# Patient Record
Sex: MALE | Race: BLACK OR AFRICAN AMERICAN | NOT HISPANIC OR LATINO | Employment: FULL TIME | URBAN - METROPOLITAN AREA
[De-identification: names, ages, dates, MRNs, and addresses within clinical notes are randomized per-mention and may not be internally consistent; named-entity substitution may affect disease eponyms.]

---

## 2017-01-11 RX ORDER — ERGOCALCIFEROL 1.25 MG/1
CAPSULE ORAL
Qty: 10 CAPSULE | Refills: 0 | OUTPATIENT
Start: 2017-01-11

## 2017-01-12 ENCOUNTER — LAB VISIT (OUTPATIENT)
Dept: LAB | Facility: HOSPITAL | Age: 67
End: 2017-01-12
Attending: FAMILY MEDICINE
Payer: COMMERCIAL

## 2017-01-12 ENCOUNTER — OFFICE VISIT (OUTPATIENT)
Dept: INTERNAL MEDICINE | Facility: CLINIC | Age: 67
End: 2017-01-12
Payer: COMMERCIAL

## 2017-01-12 VITALS
WEIGHT: 216.06 LBS | DIASTOLIC BLOOD PRESSURE: 86 MMHG | TEMPERATURE: 96 F | BODY MASS INDEX: 26.31 KG/M2 | SYSTOLIC BLOOD PRESSURE: 142 MMHG | HEIGHT: 76 IN

## 2017-01-12 DIAGNOSIS — Z12.5 PROSTATE CANCER SCREENING: ICD-10-CM

## 2017-01-12 DIAGNOSIS — Z23 NEED FOR PROPHYLACTIC VACCINATION WITH STREPTOCOCCUS PNEUMONIAE (PNEUMOCOCCUS) AND INFLUENZA VACCINES: ICD-10-CM

## 2017-01-12 DIAGNOSIS — Z00.00 ROUTINE GENERAL MEDICAL EXAMINATION AT A HEALTH CARE FACILITY: ICD-10-CM

## 2017-01-12 DIAGNOSIS — E55.9 VITAMIN D DEFICIENCY: ICD-10-CM

## 2017-01-12 DIAGNOSIS — Z00.00 ROUTINE GENERAL MEDICAL EXAMINATION AT A HEALTH CARE FACILITY: Primary | ICD-10-CM

## 2017-01-12 LAB
25(OH)D3+25(OH)D2 SERPL-MCNC: 24 NG/ML
ALBUMIN SERPL BCP-MCNC: 3.9 G/DL
ALP SERPL-CCNC: 66 U/L
ALT SERPL W/O P-5'-P-CCNC: 16 U/L
ANION GAP SERPL CALC-SCNC: 9 MMOL/L
AST SERPL-CCNC: 33 U/L
BASOPHILS # BLD AUTO: 0.01 K/UL
BASOPHILS NFR BLD: 0.2 %
BILIRUB SERPL-MCNC: 0.8 MG/DL
BUN SERPL-MCNC: 13 MG/DL
CALCIUM SERPL-MCNC: 8.9 MG/DL
CHLORIDE SERPL-SCNC: 106 MMOL/L
CHOLEST/HDLC SERPL: 2.5 {RATIO}
CO2 SERPL-SCNC: 27 MMOL/L
COMPLEXED PSA SERPL-MCNC: 1.6 NG/ML
CREAT SERPL-MCNC: 1 MG/DL
DIFFERENTIAL METHOD: NORMAL
EOSINOPHIL # BLD AUTO: 0.1 K/UL
EOSINOPHIL NFR BLD: 1.6 %
ERYTHROCYTE [DISTWIDTH] IN BLOOD BY AUTOMATED COUNT: 13.5 %
EST. GFR  (AFRICAN AMERICAN): >60 ML/MIN/1.73 M^2
EST. GFR  (NON AFRICAN AMERICAN): >60 ML/MIN/1.73 M^2
GLUCOSE SERPL-MCNC: 85 MG/DL
HCT VFR BLD AUTO: 42.4 %
HDL/CHOLESTEROL RATIO: 39.5 %
HDLC SERPL-MCNC: 172 MG/DL
HDLC SERPL-MCNC: 68 MG/DL
HGB BLD-MCNC: 14.6 G/DL
LDLC SERPL CALC-MCNC: 83.8 MG/DL
LYMPHOCYTES # BLD AUTO: 2.3 K/UL
LYMPHOCYTES NFR BLD: 45.5 %
MCH RBC QN AUTO: 30.8 PG
MCHC RBC AUTO-ENTMCNC: 34.4 %
MCV RBC AUTO: 90 FL
MONOCYTES # BLD AUTO: 0.4 K/UL
MONOCYTES NFR BLD: 8.7 %
NEUTROPHILS # BLD AUTO: 2.2 K/UL
NEUTROPHILS NFR BLD: 44 %
NONHDLC SERPL-MCNC: 104 MG/DL
PLATELET # BLD AUTO: 220 K/UL
PMV BLD AUTO: 10 FL
POTASSIUM SERPL-SCNC: 3.9 MMOL/L
PROT SERPL-MCNC: 7.4 G/DL
RBC # BLD AUTO: 4.74 M/UL
SODIUM SERPL-SCNC: 142 MMOL/L
TRIGL SERPL-MCNC: 101 MG/DL
TSH SERPL DL<=0.005 MIU/L-ACNC: 1.49 UIU/ML
WBC # BLD AUTO: 5.06 K/UL

## 2017-01-12 PROCEDURE — 85025 COMPLETE CBC W/AUTO DIFF WBC: CPT

## 2017-01-12 PROCEDURE — 90471 IMMUNIZATION ADMIN: CPT | Mod: S$GLB,,, | Performed by: FAMILY MEDICINE

## 2017-01-12 PROCEDURE — 90662 IIV NO PRSV INCREASED AG IM: CPT | Mod: S$GLB,,, | Performed by: FAMILY MEDICINE

## 2017-01-12 PROCEDURE — 84153 ASSAY OF PSA TOTAL: CPT

## 2017-01-12 PROCEDURE — 90472 IMMUNIZATION ADMIN EACH ADD: CPT | Mod: S$GLB,,, | Performed by: FAMILY MEDICINE

## 2017-01-12 PROCEDURE — 36415 COLL VENOUS BLD VENIPUNCTURE: CPT | Mod: PO

## 2017-01-12 PROCEDURE — 99397 PER PM REEVAL EST PAT 65+ YR: CPT | Mod: 25,S$GLB,, | Performed by: FAMILY MEDICINE

## 2017-01-12 PROCEDURE — 99999 PR PBB SHADOW E&M-EST. PATIENT-LVL III: CPT | Mod: PBBFAC,,, | Performed by: FAMILY MEDICINE

## 2017-01-12 PROCEDURE — 90670 PCV13 VACCINE IM: CPT | Mod: S$GLB,,, | Performed by: FAMILY MEDICINE

## 2017-01-12 PROCEDURE — 80061 LIPID PANEL: CPT

## 2017-01-12 PROCEDURE — 80053 COMPREHEN METABOLIC PANEL: CPT

## 2017-01-12 PROCEDURE — 84443 ASSAY THYROID STIM HORMONE: CPT

## 2017-01-12 PROCEDURE — 82306 VITAMIN D 25 HYDROXY: CPT

## 2017-01-12 NOTE — MR AVS SNAPSHOT
[facility] Internal Medicine  [address] [facility] [address] [facility] [address] [place] [address]  Phone: [phone]  Fax: [phone]                  Lalito De Paz   2017 8:00 AM   Office Visit    Description:  Male : 1950   Provider:  Sussy Forrest MD   Department:  [facility] - Internal Medicine           Reason for Visit     Annual Exam           Diagnoses this Visit        Comments    Routine general medical examination at a health care facility    -  Primary     Vitamin D deficiency         Prostate cancer screening         Need for prophylactic vaccination with Streptococcus pneumoniae (Pneumococcus) and Influenza vaccines                To Do List           Future Appointments        Provider Department Dept Phone    2017 9:00 AM LAB, SAME DAY [facility] [phone]    2017 9:40 AM SPECIMEN, [facility] [phone]      Goals (5 Years of Data)     None      Follow-Up and Disposition     Return if symptoms worsen or fail to improve.      [facility]     [facility] Care [facility] -  Assistance  Registered nurses in the [facility] provide clinical advisement, health education, appointment booking, and other advisory services.  Call for this free service at [phone].             Medications           Message regarding Medications     Verify the changes and/or additions to your medication regime listed below are the same as discussed with your clinician today.  If any of these changes or additions are incorrect, please notify your healthcare provider.        STOP taking these medications     meloxicam (MOBIC) 15 MG tablet Take 1 tablet (15 mg total) by mouth daily as needed for Pain.    tadalafil (CIALIS) 5 MG tablet Take 1 tablet (5 mg total) by mouth daily as needed for Erectile Dysfunction.           Verify that the below list of medications is an accurate representation of the medications you are currently taking.  If none  "reported, the list may be blank. If incorrect, please contact your healthcare provider. Carry this list with you in case of emergency.           Current Medications     ergocalciferol (VITAMIN D2) 50,000 unit Cap TAKE 1 CAPSULE (50,000 UNITS TOTAL) BY MOUTH EVERY 7 DAYS.           Clinical Reference Information           Vital Signs - Last Recorded  Most recent update: 1/12/2017  8:01 AM by Kitty Marino LPN    BP Temp Ht Wt BMI    (!) 142/86 96.3 °F (35.7 °C) (Tympanic) 6' 4" (1.93 m) 98 kg (216 lb 0.8 oz) 26.3 kg/m2      Blood Pressure          Most Recent Value    BP  (!)  142/86      Allergies as of 1/12/2017     No Known Allergies      Immunizations Administered on Date of Encounter - 1/12/2017     Name Date Dose VIS Date Route    Influenza - High Dose 1/12/2017 0.5 mL 8/7/2015 Intramuscular    Pneumococcal Conjugate - 13 Valent 1/12/2017 0.5 mL 11/5/2015 Intramuscular      Orders Placed During Today's Visit      Normal Orders This Visit    Influenza - High Dose (65+) (PF) (IM)     Pneumococcal Conjugate Vaccine (13 Valent) (IM)     Future Labs/Procedures Expected by Expires    CBC auto differential  1/12/2017 3/13/2018    Comprehensive metabolic panel  1/12/2017 3/13/2018    Lipid panel  1/12/2017 3/13/2018    PSA, Screening  1/12/2017 3/13/2018    TSH  1/12/2017 3/13/2018    Urinalysis  1/12/2017 3/13/2018    Vitamin D  1/12/2017 3/13/2018      "

## 2017-01-12 NOTE — PROGRESS NOTES
"Subjective:       Patient ID: Lalito De Paz is a 66 y.o. male.    Chief Complaint: Annual Exam    HPI Comments: 66-year-old -American male patient with Patient Active Problem List:     Vitamin D deficiency  Here for routine annual physical.  Patient reported that he will be retiring end of February and will be moving to [place].   Patient has been taking vitamin D by prescription.  Patient stays physically active and denies of any chest pain or shortness of breath, palpitations, abdominal discomfort changes in appetite.   Patient has been waking up once at bedtime off and on but not regularly.  Denies of any other complaints today      Review of Systems   Constitutional: Negative for appetite change and fatigue.   Eyes: Negative for visual disturbance.   Respiratory: Negative for shortness of breath.    Cardiovascular: Negative for chest pain, palpitations and leg swelling.   Gastrointestinal: Negative for abdominal pain, nausea and vomiting.   Musculoskeletal: Negative for myalgias.   Skin: Negative for rash.   Neurological: Negative for headaches.   Psychiatric/Behavioral: Negative for sleep disturbance.         Visit Vitals    BP (!) 142/86    Temp 96.3 °F (35.7 °C) (Tympanic)    Ht 6' 4" (1.93 m)    Wt 98 kg (216 lb 0.8 oz)    BMI 26.3 kg/m2     Objective:      Physical Exam   Constitutional: He is oriented to person, place, and time. He appears well-developed and well-nourished.   HENT:   Head: Normocephalic and atraumatic.   Mouth/Throat: Oropharynx is clear and moist.   Cardiovascular: Normal rate, regular rhythm and normal heart sounds.    No murmur heard.  Pulmonary/Chest: Effort normal and breath sounds normal. He has no wheezes.   Abdominal: Soft. Bowel sounds are normal. There is no tenderness.   Musculoskeletal: He exhibits no edema or tenderness.   Neurological: He is alert and oriented to person, place, and time.   Skin: Skin is warm and dry. No rash noted.   Psychiatric: He has a " normal mood and affect.         Assessment:       1. Routine general medical examination at a health care facility    2. Vitamin D deficiency    3. Prostate cancer screening    4. Need for prophylactic vaccination with Streptococcus pneumoniae (Pneumococcus) and Influenza vaccines        Plan:   Routine general medical examination at a health care facility  -     CBC auto differential; Future; Expected date: 1/12/17  -     Comprehensive metabolic panel; Future; Expected date: 1/12/17  -     Lipid panel; Future; Expected date: 1/12/17  -     TSH; Future; Expected date: 1/12/17  -     Urinalysis; Future; Expected date: 1/12/17  Vital signs stable today.  Clinical exam normal.  Encouraged to maintain lifestyle modifications with low-fat and low-cholesterol diet and exercise 30 minutes daily.  Noted mild elevation blood pressures, advised to restrict salt intake      Vitamin D deficiency  -     Vitamin D; Future; Expected date: 1/12/17  Currently taking prescription vitamin D weekly    Prostate cancer screening  -     PSA, Screening; Future; Expected date: 1/12/17    Need for prophylactic vaccination with Streptococcus pneumoniae (Pneumococcus) and Influenza vaccines  -     Pneumococcal Conjugate Vaccine (13 Valent) (IM)    Other orders  -     Influenza - High Dose (65+) (PF) (IM)  Prevnar and flu shot given today.  Patient was advised to get Pneumovax next year.   It was a pleasure taking care of you, let me know if you having any further concerns before moving to [place].

## 2017-01-13 ENCOUNTER — PATIENT MESSAGE (OUTPATIENT)
Dept: INTERNAL MEDICINE | Facility: CLINIC | Age: 67
End: 2017-01-13

## 2017-01-13 ENCOUNTER — TELEPHONE (OUTPATIENT)
Dept: INTERNAL MEDICINE | Facility: CLINIC | Age: 67
End: 2017-01-13

## 2017-01-13 DIAGNOSIS — E55.9 VITAMIN D DEFICIENCY: Primary | ICD-10-CM

## 2017-01-13 RX ORDER — ERGOCALCIFEROL 1.25 MG/1
CAPSULE ORAL
Qty: 10 CAPSULE | Refills: 0 | Status: SHIPPED | OUTPATIENT
Start: 2017-01-13 | End: 2017-03-24 | Stop reason: SDUPTHER

## 2017-03-24 DIAGNOSIS — E55.9 VITAMIN D DEFICIENCY: ICD-10-CM

## 2017-03-24 RX ORDER — ERGOCALCIFEROL 1.25 MG/1
CAPSULE ORAL
Qty: 10 CAPSULE | Refills: 0 | Status: SHIPPED | OUTPATIENT
Start: 2017-03-24 | End: 2017-05-13 | Stop reason: SDUPTHER

## 2017-05-13 DIAGNOSIS — E55.9 VITAMIN D DEFICIENCY: ICD-10-CM

## 2017-05-15 RX ORDER — ERGOCALCIFEROL 1.25 MG/1
CAPSULE ORAL
Qty: 10 CAPSULE | Refills: 0 | Status: SHIPPED | OUTPATIENT
Start: 2017-05-15 | End: 2017-08-03 | Stop reason: SDUPTHER

## 2017-08-03 DIAGNOSIS — E55.9 VITAMIN D DEFICIENCY: ICD-10-CM

## 2017-08-03 RX ORDER — ERGOCALCIFEROL 1.25 MG/1
CAPSULE ORAL
Qty: 10 CAPSULE | Refills: 0 | Status: SHIPPED | OUTPATIENT
Start: 2017-08-03 | End: 2017-09-24 | Stop reason: SDUPTHER

## 2017-09-24 DIAGNOSIS — E55.9 VITAMIN D DEFICIENCY: ICD-10-CM

## 2017-09-24 RX ORDER — ERGOCALCIFEROL 1.25 MG/1
CAPSULE ORAL
Qty: 10 CAPSULE | Refills: 0 | Status: SHIPPED | OUTPATIENT
Start: 2017-09-24

## 2017-12-08 DIAGNOSIS — E55.9 VITAMIN D DEFICIENCY: ICD-10-CM

## 2017-12-08 RX ORDER — ERGOCALCIFEROL 1.25 MG/1
CAPSULE ORAL
Qty: 10 CAPSULE | Refills: 0 | OUTPATIENT
Start: 2017-12-08

## 2024-03-26 ENCOUNTER — PATIENT MESSAGE (OUTPATIENT)
Dept: RESEARCH | Facility: HOSPITAL | Age: 74
End: 2024-03-26
Payer: COMMERCIAL